# Patient Record
Sex: FEMALE | Race: WHITE | NOT HISPANIC OR LATINO | Employment: FULL TIME | URBAN - METROPOLITAN AREA
[De-identification: names, ages, dates, MRNs, and addresses within clinical notes are randomized per-mention and may not be internally consistent; named-entity substitution may affect disease eponyms.]

---

## 2022-08-15 ENCOUNTER — HOSPITAL ENCOUNTER (EMERGENCY)
Facility: HOSPITAL | Age: 57
Discharge: HOME | End: 2022-08-15
Attending: EMERGENCY MEDICINE | Admitting: EMERGENCY MEDICINE
Payer: COMMERCIAL

## 2022-08-15 ENCOUNTER — APPOINTMENT (OUTPATIENT)
Dept: CT IMAGING | Facility: HOSPITAL | Age: 57
End: 2022-08-15
Attending: EMERGENCY MEDICINE
Payer: COMMERCIAL

## 2022-08-15 VITALS
WEIGHT: 140 LBS | HEIGHT: 64 IN | HEART RATE: 102 BPM | OXYGEN SATURATION: 97 % | BODY MASS INDEX: 23.9 KG/M2 | DIASTOLIC BLOOD PRESSURE: 70 MMHG | RESPIRATION RATE: 20 BRPM | SYSTOLIC BLOOD PRESSURE: 126 MMHG

## 2022-08-15 DIAGNOSIS — R11.2 NON-INTRACTABLE VOMITING WITH NAUSEA, UNSPECIFIED VOMITING TYPE: ICD-10-CM

## 2022-08-15 DIAGNOSIS — E83.42 HYPOMAGNESEMIA: ICD-10-CM

## 2022-08-15 DIAGNOSIS — R07.89 ATYPICAL CHEST PAIN: ICD-10-CM

## 2022-08-15 DIAGNOSIS — E87.6 HYPOKALEMIA: ICD-10-CM

## 2022-08-15 LAB
ALBUMIN SERPL-MCNC: 3.8 G/DL (ref 3.5–5)
ALP SERPL-CCNC: 78 U/L (ref 45–120)
ALT SERPL W P-5'-P-CCNC: 56 U/L (ref 0–45)
ANION GAP SERPL CALCULATED.3IONS-SCNC: 19 MMOL/L (ref 5–18)
AST SERPL W P-5'-P-CCNC: 56 U/L (ref 0–40)
BILIRUB DIRECT SERPL-MCNC: 0.3 MG/DL
BILIRUB SERPL-MCNC: 0.8 MG/DL (ref 0–1)
BUN SERPL-MCNC: 4 MG/DL (ref 8–22)
CALCIUM SERPL-MCNC: 9.3 MG/DL (ref 8.5–10.5)
CHLORIDE BLD-SCNC: 87 MMOL/L (ref 98–107)
CO2 SERPL-SCNC: 25 MMOL/L (ref 22–31)
CREAT SERPL-MCNC: 0.6 MG/DL (ref 0.6–1.1)
D DIMER PPP FEU-MCNC: 1.11 UG/ML FEU (ref 0–0.5)
ERYTHROCYTE [DISTWIDTH] IN BLOOD BY AUTOMATED COUNT: 12.9 % (ref 10–15)
GFR SERPL CREATININE-BSD FRML MDRD: >90 ML/MIN/1.73M2
GLUCOSE BLD-MCNC: 116 MG/DL (ref 70–125)
HCT VFR BLD AUTO: 34.9 % (ref 35–47)
HGB BLD-MCNC: 12.7 G/DL (ref 11.7–15.7)
HOLD SPECIMEN: NORMAL
HOLD SPECIMEN: NORMAL
LIPASE SERPL-CCNC: 52 U/L (ref 0–52)
MAGNESIUM SERPL-MCNC: 1.6 MG/DL (ref 1.8–2.6)
MCH RBC QN AUTO: 30.9 PG (ref 26.5–33)
MCHC RBC AUTO-ENTMCNC: 36.4 G/DL (ref 31.5–36.5)
MCV RBC AUTO: 85 FL (ref 78–100)
PLATELET # BLD AUTO: 284 10E3/UL (ref 150–450)
POTASSIUM BLD-SCNC: 2.9 MMOL/L (ref 3.5–5)
PROT SERPL-MCNC: 6.8 G/DL (ref 6–8)
RBC # BLD AUTO: 4.11 10E6/UL (ref 3.8–5.2)
SODIUM SERPL-SCNC: 131 MMOL/L (ref 136–145)
TROPONIN I SERPL-MCNC: 0.04 NG/ML (ref 0–0.29)
WBC # BLD AUTO: 8.3 10E3/UL (ref 4–11)

## 2022-08-15 PROCEDURE — 258N000003 HC RX IP 258 OP 636: Performed by: EMERGENCY MEDICINE

## 2022-08-15 PROCEDURE — 250N000013 HC RX MED GY IP 250 OP 250 PS 637: Performed by: EMERGENCY MEDICINE

## 2022-08-15 PROCEDURE — 96368 THER/DIAG CONCURRENT INF: CPT

## 2022-08-15 PROCEDURE — 96365 THER/PROPH/DIAG IV INF INIT: CPT | Mod: 59

## 2022-08-15 PROCEDURE — 85027 COMPLETE CBC AUTOMATED: CPT | Performed by: EMERGENCY MEDICINE

## 2022-08-15 PROCEDURE — 93005 ELECTROCARDIOGRAM TRACING: CPT | Performed by: EMERGENCY MEDICINE

## 2022-08-15 PROCEDURE — 99285 EMERGENCY DEPT VISIT HI MDM: CPT | Mod: 25

## 2022-08-15 PROCEDURE — 250N000011 HC RX IP 250 OP 636: Performed by: EMERGENCY MEDICINE

## 2022-08-15 PROCEDURE — 85379 FIBRIN DEGRADATION QUANT: CPT | Performed by: EMERGENCY MEDICINE

## 2022-08-15 PROCEDURE — 96361 HYDRATE IV INFUSION ADD-ON: CPT

## 2022-08-15 PROCEDURE — 36415 COLL VENOUS BLD VENIPUNCTURE: CPT | Performed by: EMERGENCY MEDICINE

## 2022-08-15 PROCEDURE — 36415 COLL VENOUS BLD VENIPUNCTURE: CPT | Performed by: STUDENT IN AN ORGANIZED HEALTH CARE EDUCATION/TRAINING PROGRAM

## 2022-08-15 PROCEDURE — 96375 TX/PRO/DX INJ NEW DRUG ADDON: CPT | Mod: 59

## 2022-08-15 PROCEDURE — 84484 ASSAY OF TROPONIN QUANT: CPT | Performed by: EMERGENCY MEDICINE

## 2022-08-15 PROCEDURE — 83690 ASSAY OF LIPASE: CPT | Performed by: EMERGENCY MEDICINE

## 2022-08-15 PROCEDURE — 93005 ELECTROCARDIOGRAM TRACING: CPT | Performed by: STUDENT IN AN ORGANIZED HEALTH CARE EDUCATION/TRAINING PROGRAM

## 2022-08-15 PROCEDURE — 82310 ASSAY OF CALCIUM: CPT | Performed by: EMERGENCY MEDICINE

## 2022-08-15 PROCEDURE — 71275 CT ANGIOGRAPHY CHEST: CPT

## 2022-08-15 PROCEDURE — 82248 BILIRUBIN DIRECT: CPT | Performed by: EMERGENCY MEDICINE

## 2022-08-15 PROCEDURE — 85027 COMPLETE CBC AUTOMATED: CPT | Performed by: STUDENT IN AN ORGANIZED HEALTH CARE EDUCATION/TRAINING PROGRAM

## 2022-08-15 PROCEDURE — 83735 ASSAY OF MAGNESIUM: CPT | Performed by: EMERGENCY MEDICINE

## 2022-08-15 RX ORDER — POTASSIUM CHLORIDE 1.5 G/1.58G
40 POWDER, FOR SOLUTION ORAL ONCE
Status: COMPLETED | OUTPATIENT
Start: 2022-08-15 | End: 2022-08-15

## 2022-08-15 RX ORDER — POTASSIUM CHLORIDE 7.45 MG/ML
10 INJECTION INTRAVENOUS ONCE
Status: COMPLETED | OUTPATIENT
Start: 2022-08-15 | End: 2022-08-15

## 2022-08-15 RX ORDER — ONDANSETRON 2 MG/ML
4 INJECTION INTRAMUSCULAR; INTRAVENOUS ONCE
Status: COMPLETED | OUTPATIENT
Start: 2022-08-15 | End: 2022-08-15

## 2022-08-15 RX ORDER — IOPAMIDOL 755 MG/ML
75 INJECTION, SOLUTION INTRAVASCULAR ONCE
Status: COMPLETED | OUTPATIENT
Start: 2022-08-15 | End: 2022-08-15

## 2022-08-15 RX ORDER — IBUPROFEN 600 MG/1
600 TABLET, FILM COATED ORAL ONCE
Status: COMPLETED | OUTPATIENT
Start: 2022-08-15 | End: 2022-08-15

## 2022-08-15 RX ORDER — NITROGLYCERIN 0.4 MG/1
0.4 TABLET SUBLINGUAL EVERY 5 MIN PRN
Status: COMPLETED | OUTPATIENT
Start: 2022-08-15 | End: 2022-08-15

## 2022-08-15 RX ORDER — ONDANSETRON 4 MG/1
4 TABLET, ORALLY DISINTEGRATING ORAL EVERY 8 HOURS PRN
Qty: 10 TABLET | Refills: 0 | Status: SHIPPED | OUTPATIENT
Start: 2022-08-15 | End: 2022-08-18

## 2022-08-15 RX ORDER — MAGNESIUM SULFATE HEPTAHYDRATE 40 MG/ML
2 INJECTION, SOLUTION INTRAVENOUS ONCE
Status: COMPLETED | OUTPATIENT
Start: 2022-08-15 | End: 2022-08-15

## 2022-08-15 RX ADMIN — MAGNESIUM SULFATE IN WATER 2 G: 40 INJECTION, SOLUTION INTRAVENOUS at 15:49

## 2022-08-15 RX ADMIN — NITROGLYCERIN 0.4 MG: 0.4 TABLET, ORALLY DISINTEGRATING SUBLINGUAL at 13:41

## 2022-08-15 RX ADMIN — IBUPROFEN 600 MG: 600 TABLET ORAL at 15:49

## 2022-08-15 RX ADMIN — SODIUM CHLORIDE 1000 ML: 9 INJECTION, SOLUTION INTRAVENOUS at 14:14

## 2022-08-15 RX ADMIN — POTASSIUM CHLORIDE 10 MEQ: 7.46 INJECTION, SOLUTION INTRAVENOUS at 15:56

## 2022-08-15 RX ADMIN — NITROGLYCERIN 0.4 MG: 0.4 TABLET, ORALLY DISINTEGRATING SUBLINGUAL at 13:35

## 2022-08-15 RX ADMIN — IOPAMIDOL 75 ML: 755 INJECTION, SOLUTION INTRAVENOUS at 15:39

## 2022-08-15 RX ADMIN — ONDANSETRON 4 MG: 2 INJECTION INTRAMUSCULAR; INTRAVENOUS at 13:54

## 2022-08-15 RX ADMIN — POTASSIUM CHLORIDE 40 MEQ: 1.5 POWDER, FOR SOLUTION ORAL at 15:49

## 2022-08-15 RX ADMIN — NITROGLYCERIN 0.4 MG: 0.4 TABLET, ORALLY DISINTEGRATING SUBLINGUAL at 13:24

## 2022-08-15 ASSESSMENT — ENCOUNTER SYMPTOMS
HEADACHES: 1
VOMITING: 1
DIAPHORESIS: 1
NAUSEA: 1
SHORTNESS OF BREATH: 1

## 2022-08-15 ASSESSMENT — ACTIVITIES OF DAILY LIVING (ADL)
ADLS_ACUITY_SCORE: 35

## 2022-08-15 NOTE — ED TRIAGE NOTES
Pt comes in with chest pain that started in the middle of the night, unsure the exact time. Pt has taken tylenol with no relief. Denies cardiac hx. Pt pain in center/left chest and radiates to L shoulder. Pain worse with walking.

## 2022-08-15 NOTE — DISCHARGE INSTRUCTIONS
Aside from your low magnesium and potassium levels your laboratory tests all appear reassuring.  EKG, cardiac enzymes, and chest CT scan also appeared reassuring.       Use the prescribed Zofran as needed for any further episodes of nausea or vomiting.  Continue to drink plenty of electrolyte containing fluids over the next few days.    Please follow-up with your primary care provider for reevaluation and for a recheck of your potassium and magnesium levels.  Return back to ED sooner for any worsening chest pain, shortness of breath, vomiting, fevers, or any other new or concerning symptoms.

## 2022-08-15 NOTE — ED PROVIDER NOTES
EMERGENCY DEPARTMENT ENCOUNTER      NAME: Roxy Snyder  AGE: 56 year old female  YOB: 1965  MRN: [id number]  EVALUATION DATE & TIME: 8/15/2022 12:38 PM    PCP: No primary care provider on file.    ED PROVIDER: Ronel Phan DO      Chief Complaint   Patient presents with     Chest Pain         FINAL IMPRESSION:  1. Atypical chest pain    2. Hypokalemia    3. Hypomagnesemia    4. Non-intractable vomiting with nausea, unspecified vomiting type          ED COURSE & MEDICAL DECISION MAKIN-year-old female presented to the ED for evaluation of sudden onset left-sided chest pain that woke her up from sleep last night.  The patient states that the chest pain associated with nausea and vomiting and diaphoresis.  The chest pain had been ongoing continuously prior to ED arrival.  Upon arrival to the ED the patient was noted to be slightly hypertensive but she was otherwise hemodynamically stable.  The patient received nitroglycerin prior to my evaluation.  The patient stated that the chest pain resolved with glycerin but she was no experiencing a headache.  She was also still experiencing nausea and vomiting.  Exam the patient was noted to have reproducible epigastric abdominal tenderness to palpation.  The remainder of the physical exam was unremarkable.    EKG revealed normal sinus rhythm without any new or concerning ST or T wave changes.  CBC, hepatic panel, lipase, troponin were all reassuring.  The patient's potassium was low at 2.9.  Sodium was also low at 131 and magnesium was low at 1.6.  Patient was given IV fluids, IV magnesium, and both IV and oral potassium.      The patient's D-dimer was also elevated slightly at 1.1.  Chest CT scan was obtained.  CT scan was negative for a PE.  Other than some mild air trapping the chest CT scan was unremarkable.     The patient was reevaluated after receiving the IV magnesium and potassium.  The patient stated that she was feeling better and she denies  "any chest pain.  The patient was informed that her chest pain is likely related to spasms which may be related to the low magnesium and potassium levels.  This may also be contributing to the \"charley horses\" that the patient experienced recently.  At the time of reevaluation the patient was requesting to return home.  The patient was sent home with Zofran to take as needed for any further episodes of nausea or vomiting.  She was instructed to follow-up with her primary care provider for reevaluation and for recheck of her potassium and magnesium levels within the next few days.  Patient was instructed to return back to ED sooner for any worsening chest pain, shortness of breath, vomiting, dizziness, abdominal pain, or any other new or concerning symptoms.    Pertinent Labs & Imaging studies reviewed. (See chart for details)  2:01 I met with the patient to gather history and to perform my initial exam. We discussed plans for the ED course, including diagnostic testing and treatment. PPE: Provider wore gloves, N95 mask, eye protection.       At the conclusion of the encounter I discussed the results of all of the tests and the disposition. The questions were answered. The patient or family acknowledged understanding and was agreeable with the care plan.       PPE worn: n95 mask, goggles    MEDICATIONS GIVEN IN THE EMERGENCY:  Medications   nitroGLYcerin (NITROSTAT) sublingual tablet 0.4 mg (0.4 mg Sublingual Given 8/15/22 1341)   ondansetron (ZOFRAN) injection 4 mg (4 mg Intravenous Given 8/15/22 1354)   0.9% sodium chloride BOLUS (0 mLs Intravenous Stopped 8/15/22 1713)   magnesium sulfate 2 g in water intermittent infusion (0 g Intravenous Stopped 8/15/22 1704)   potassium chloride 10 mEq in 100 mL sterile water intermittent infusion (premix) (0 mEq Intravenous Stopped 8/15/22 1704)   potassium chloride (KLOR-CON) Packet 40 mEq (40 mEq Oral Given 8/15/22 1549)   ibuprofen (ADVIL/MOTRIN) tablet 600 mg (600 mg Oral " Given 8/15/22 1549)   iopamidol (ISOVUE-370) solution 75 mL (75 mLs Intravenous Given 8/15/22 1539)       NEW PRESCRIPTIONS STARTED AT TODAY'S ER VISIT  New Prescriptions    ONDANSETRON (ZOFRAN ODT) 4 MG ODT TAB    Take 1 tablet (4 mg) by mouth every 8 hours as needed          =================================================================    HPI    Patient information was obtained from: the patient     Use of : N/A    Roxy Snyder is a 56 year old female with no pertinent medical history, who presents to this ED by walk-in for evaluation of chest pain.    Patient reports that while she was sleeping last night, she woke up to a new chest pain and sweating. She describes her pain as starting in the left side of her chest and radiating to her left shoulder. Chest pain has been constant since onset. Also reports of bassem horses in legs last night. She reports shortness of breath while walking and while she was laying in bed. She took Tylenol with no relief and reports that sitting up does not provide relief.    Currently, she reports feeling hot and cold flashes, vomiting, nausea, and a headache from the medications given to her upon arrival. She reports that the pain has subsided since receiving medication in the ED, but still endorses nausea.     No other reported medical conditions at this time.     SHx: Patient reports smoking cigarettes. She does not drink alcohol.     REVIEW OF SYSTEMS   Review of Systems   Constitutional: Positive for diaphoresis (resolved).        Positive for hot and cold flashes   Respiratory: Positive for shortness of breath.    Cardiovascular: Positive for chest pain.   Gastrointestinal: Positive for nausea and vomiting.   Musculoskeletal:        Positive for left shoulder pain (secondary to chest pain)   Neurological: Positive for headaches (secondary to medications).   All other systems reviewed and are negative.       PAST MEDICAL HISTORY:  No past medical history on  "file.    PAST SURGICAL HISTORY:  No past surgical history on file.        CURRENT MEDICATIONS:    ondansetron (ZOFRAN ODT) 4 MG ODT tab        ALLERGIES:  No Known Allergies    FAMILY HISTORY:  No family history on file.    SOCIAL HISTORY:   Social History     Socioeconomic History     Marital status: Single       VITALS:  /70   Pulse 102   Resp 20   Ht 1.626 m (5' 4\")   Wt 63.5 kg (140 lb)   SpO2 97%   BMI 24.03 kg/m      PHYSICAL EXAM    General presentation: Alert, Vital signs reviewed. Fatigued.  HENT: ENT inspection is normal. Oropharynx is moist and clear.   Eye: Pupils are equal and reactive to light. EOMI  Neck: The neck is supple, with full ROM, with no evidence of meningismus.  Pulmonary: Currently in no acute respiratory distress. Normal, non labored respirations, the lung sounds are normal with good equal air movement. Clear to auscultation bilaterally.   Circulatory: Regular rate and rhythm. Peripheral pulses are strong and equal. No murmurs, rubs, or gallops.   Abdominal: The abdomen is soft. Mild epigastric tenderness to palpation. No rigidity, guarding, or rebound. Bowel sounds normal.   Neurologic: Alert, oriented to person, place, and time. No motor deficit. No sensory deficit. Cranial nerves II through XII are intact.  Musculoskeletal: No extremity tenderness. Full range of motion in all extremities. No extremity edema.   Skin: Skin color is normal. No rash. Warm. Dry to touch.      LAB:  All pertinent labs reviewed and interpreted.  Results for orders placed or performed during the hospital encounter of 08/15/22   CT Chest Pulmonary Embolism w Contrast    Impression    IMPRESSION:  1.  No pulmonary embolus.  2.  Question of a mild amount of air trapping involving the bilateral lobes. This can be seen with small airways disease.   CBC (+ platelets, no diff)   Result Value Ref Range    WBC Count 8.3 4.0 - 11.0 10e3/uL    RBC Count 4.11 3.80 - 5.20 10e6/uL    Hemoglobin 12.7 11.7 - 15.7 " g/dL    Hematocrit 34.9 (L) 35.0 - 47.0 %    MCV 85 78 - 100 fL    MCH 30.9 26.5 - 33.0 pg    MCHC 36.4 31.5 - 36.5 g/dL    RDW 12.9 10.0 - 15.0 %    Platelet Count 284 150 - 450 10e3/uL   Basic metabolic panel   Result Value Ref Range    Sodium 131 (L) 136 - 145 mmol/L    Potassium 2.9 (L) 3.5 - 5.0 mmol/L    Chloride 87 (L) 98 - 107 mmol/L    Carbon Dioxide (CO2) 25 22 - 31 mmol/L    Anion Gap 19 (H) 5 - 18 mmol/L    Urea Nitrogen 4 (L) 8 - 22 mg/dL    Creatinine 0.60 0.60 - 1.10 mg/dL    Calcium 9.3 8.5 - 10.5 mg/dL    Glucose 116 70 - 125 mg/dL    GFR Estimate >90 >60 mL/min/1.73m2   Troponin I (now)   Result Value Ref Range    Troponin I 0.04 0.00 - 0.29 ng/mL   Extra Blue Top Tube   Result Value Ref Range    Hold Specimen JIC    Extra Purple Top Tube   Result Value Ref Range    Hold Specimen JIC    Hepatic function panel   Result Value Ref Range    Bilirubin Total 0.8 0.0 - 1.0 mg/dL    Bilirubin Direct 0.3 <=0.5 mg/dL    Protein Total 6.8 6.0 - 8.0 g/dL    Albumin 3.8 3.5 - 5.0 g/dL    Alkaline Phosphatase 78 45 - 120 U/L    AST 56 (H) 0 - 40 U/L    ALT 56 (H) 0 - 45 U/L   Result Value Ref Range    Lipase 52 0 - 52 U/L   D dimer quantitative   Result Value Ref Range    D-Dimer Quantitative 1.11 (H) 0.00 - 0.50 ug/mL FEU   Result Value Ref Range    Magnesium 1.6 (L) 1.8 - 2.6 mg/dL       RADIOLOGY:  Reviewed all pertinent imaging. Please see official radiology report.  CT Chest Pulmonary Embolism w Contrast   Final Result   IMPRESSION:   1.  No pulmonary embolus.   2.  Question of a mild amount of air trapping involving the bilateral lobes. This can be seen with small airways disease.          EKG:    Sinus tachycardia.  Rate of 103.  Normal QRS.  Normal QT.  No ST or T wave changes.  No previous EKG available for comparison.    I have independently reviewed and interpreted the EKG(s) documented above.      ILiu , am serving as a scribe to document services personally performed by Jaremy  DO Emilie based on my observation and the provider's statements to me. I, Ronel Phan, attest that Liu Elizabeth is acting in a scribe capacity, has observed my performance of the services and has documented them in accordance with my direction.    Ronel Phan DO  Emergency Medicine  [facility] EMERGENCY DEPARTMENT  [address] [place] [id number]  [phone]     Ronel Phan DO  08/15/22 5247

## 2022-08-16 ENCOUNTER — HOSPITAL ENCOUNTER (EMERGENCY)
Facility: HOSPITAL | Age: 57
Discharge: HOME | End: 2022-08-16
Attending: EMERGENCY MEDICINE | Admitting: EMERGENCY MEDICINE
Payer: COMMERCIAL

## 2022-08-16 VITALS
TEMPERATURE: 98.4 F | SYSTOLIC BLOOD PRESSURE: 119 MMHG | BODY MASS INDEX: 24.8 KG/M2 | HEART RATE: 90 BPM | WEIGHT: 140 LBS | HEIGHT: 63 IN | DIASTOLIC BLOOD PRESSURE: 67 MMHG | RESPIRATION RATE: 22 BRPM | OXYGEN SATURATION: 98 %

## 2022-08-16 DIAGNOSIS — R07.9 CHEST PAIN, UNSPECIFIED TYPE: ICD-10-CM

## 2022-08-16 LAB
ALBUMIN SERPL-MCNC: 3.4 G/DL (ref 3.5–5)
ALP SERPL-CCNC: 71 U/L (ref 45–120)
ALT SERPL W P-5'-P-CCNC: 52 U/L (ref 0–45)
ANION GAP SERPL CALCULATED.3IONS-SCNC: 9 MMOL/L (ref 5–18)
AST SERPL W P-5'-P-CCNC: 45 U/L (ref 0–40)
ATRIAL RATE - MUSE: 103 BPM
ATRIAL RATE - MUSE: 94 BPM
BASOPHILS # BLD AUTO: 0 10E3/UL (ref 0–0.2)
BASOPHILS NFR BLD AUTO: 0 %
BILIRUB DIRECT SERPL-MCNC: 0.3 MG/DL
BILIRUB SERPL-MCNC: 0.7 MG/DL (ref 0–1)
BUN SERPL-MCNC: 7 MG/DL (ref 8–22)
CALCIUM SERPL-MCNC: 9 MG/DL (ref 8.5–10.5)
CHLORIDE BLD-SCNC: 94 MMOL/L (ref 98–107)
CO2 SERPL-SCNC: 29 MMOL/L (ref 22–31)
CREAT SERPL-MCNC: 0.7 MG/DL (ref 0.6–1.1)
DIASTOLIC BLOOD PRESSURE - MUSE: 65 MMHG
DIASTOLIC BLOOD PRESSURE - MUSE: NORMAL MMHG
EOSINOPHIL # BLD AUTO: 0 10E3/UL (ref 0–0.7)
EOSINOPHIL NFR BLD AUTO: 0 %
ERYTHROCYTE [DISTWIDTH] IN BLOOD BY AUTOMATED COUNT: 13.2 % (ref 10–15)
FLUAV RNA SPEC QL NAA+PROBE: NEGATIVE
FLUBV RNA RESP QL NAA+PROBE: NEGATIVE
GFR SERPL CREATININE-BSD FRML MDRD: >90 ML/MIN/1.73M2
GLUCOSE BLD-MCNC: 150 MG/DL (ref 70–125)
HCT VFR BLD AUTO: 33.2 % (ref 35–47)
HGB BLD-MCNC: 11.7 G/DL (ref 11.7–15.7)
IMM GRANULOCYTES # BLD: 0 10E3/UL
IMM GRANULOCYTES NFR BLD: 1 %
INTERPRETATION ECG - MUSE: NORMAL
INTERPRETATION ECG - MUSE: NORMAL
LIPASE SERPL-CCNC: 83 U/L (ref 0–52)
LYMPHOCYTES # BLD AUTO: 1.3 10E3/UL (ref 0.8–5.3)
LYMPHOCYTES NFR BLD AUTO: 22 %
MAGNESIUM SERPL-MCNC: 1.9 MG/DL (ref 1.8–2.6)
MCH RBC QN AUTO: 30.9 PG (ref 26.5–33)
MCHC RBC AUTO-ENTMCNC: 35.2 G/DL (ref 31.5–36.5)
MCV RBC AUTO: 88 FL (ref 78–100)
MONOCYTES # BLD AUTO: 0.4 10E3/UL (ref 0–1.3)
MONOCYTES NFR BLD AUTO: 7 %
NEUTROPHILS # BLD AUTO: 4.2 10E3/UL (ref 1.6–8.3)
NEUTROPHILS NFR BLD AUTO: 70 %
NRBC # BLD AUTO: 0 10E3/UL
NRBC BLD AUTO-RTO: 0 /100
P AXIS - MUSE: 67 DEGREES
P AXIS - MUSE: NORMAL DEGREES
PLATELET # BLD AUTO: 222 10E3/UL (ref 150–450)
POTASSIUM BLD-SCNC: 2.9 MMOL/L (ref 3.5–5)
PR INTERVAL - MUSE: 112 MS
PR INTERVAL - MUSE: 128 MS
PROT SERPL-MCNC: 6.5 G/DL (ref 6–8)
QRS DURATION - MUSE: 88 MS
QRS DURATION - MUSE: 92 MS
QT - MUSE: 372 MS
QT - MUSE: 408 MS
QTC - MUSE: 487 MS
QTC - MUSE: 510 MS
R AXIS - MUSE: 55 DEGREES
R AXIS - MUSE: 74 DEGREES
RBC # BLD AUTO: 3.79 10E6/UL (ref 3.8–5.2)
RSV RNA SPEC NAA+PROBE: NEGATIVE
SARS-COV-2 RNA RESP QL NAA+PROBE: NEGATIVE
SODIUM SERPL-SCNC: 132 MMOL/L (ref 136–145)
SYSTOLIC BLOOD PRESSURE - MUSE: 127 MMHG
SYSTOLIC BLOOD PRESSURE - MUSE: NORMAL MMHG
T AXIS - MUSE: 82 DEGREES
T AXIS - MUSE: 82 DEGREES
TROPONIN I SERPL-MCNC: 0.03 NG/ML (ref 0–0.29)
VENTRICULAR RATE- MUSE: 103 BPM
VENTRICULAR RATE- MUSE: 94 BPM
WBC # BLD AUTO: 5.9 10E3/UL (ref 4–11)

## 2022-08-16 PROCEDURE — 83690 ASSAY OF LIPASE: CPT | Performed by: EMERGENCY MEDICINE

## 2022-08-16 PROCEDURE — 99284 EMERGENCY DEPT VISIT MOD MDM: CPT | Mod: 25

## 2022-08-16 PROCEDURE — 258N000003 HC RX IP 258 OP 636: Performed by: EMERGENCY MEDICINE

## 2022-08-16 PROCEDURE — 83735 ASSAY OF MAGNESIUM: CPT | Performed by: EMERGENCY MEDICINE

## 2022-08-16 PROCEDURE — C9803 HOPD COVID-19 SPEC COLLECT: HCPCS

## 2022-08-16 PROCEDURE — 96361 HYDRATE IV INFUSION ADD-ON: CPT

## 2022-08-16 PROCEDURE — 82248 BILIRUBIN DIRECT: CPT | Performed by: EMERGENCY MEDICINE

## 2022-08-16 PROCEDURE — 84484 ASSAY OF TROPONIN QUANT: CPT | Performed by: EMERGENCY MEDICINE

## 2022-08-16 PROCEDURE — 36415 COLL VENOUS BLD VENIPUNCTURE: CPT | Performed by: EMERGENCY MEDICINE

## 2022-08-16 PROCEDURE — 250N000011 HC RX IP 250 OP 636: Performed by: EMERGENCY MEDICINE

## 2022-08-16 PROCEDURE — 250N000013 HC RX MED GY IP 250 OP 250 PS 637: Performed by: EMERGENCY MEDICINE

## 2022-08-16 PROCEDURE — 96374 THER/PROPH/DIAG INJ IV PUSH: CPT

## 2022-08-16 PROCEDURE — 87637 SARSCOV2&INF A&B&RSV AMP PRB: CPT | Performed by: EMERGENCY MEDICINE

## 2022-08-16 PROCEDURE — 85025 COMPLETE CBC W/AUTO DIFF WBC: CPT | Performed by: EMERGENCY MEDICINE

## 2022-08-16 PROCEDURE — 93005 ELECTROCARDIOGRAM TRACING: CPT | Performed by: EMERGENCY MEDICINE

## 2022-08-16 RX ORDER — POTASSIUM CHLORIDE 1500 MG/1
20 TABLET, EXTENDED RELEASE ORAL 2 TIMES DAILY
Qty: 10 TABLET | Refills: 0 | Status: SHIPPED | OUTPATIENT
Start: 2022-08-16 | End: 2022-08-21

## 2022-08-16 RX ORDER — KETOROLAC TROMETHAMINE 15 MG/ML
15 INJECTION, SOLUTION INTRAMUSCULAR; INTRAVENOUS ONCE
Status: COMPLETED | OUTPATIENT
Start: 2022-08-16 | End: 2022-08-16

## 2022-08-16 RX ORDER — POTASSIUM CHLORIDE 1500 MG/1
40 TABLET, EXTENDED RELEASE ORAL ONCE
Status: COMPLETED | OUTPATIENT
Start: 2022-08-16 | End: 2022-08-16

## 2022-08-16 RX ORDER — LIDOCAINE 4 G/G
1 PATCH TOPICAL
Status: DISCONTINUED | OUTPATIENT
Start: 2022-08-16 | End: 2022-08-16

## 2022-08-16 RX ORDER — LIDOCAINE 4 G/G
1 PATCH TOPICAL ONCE
Status: DISCONTINUED | OUTPATIENT
Start: 2022-08-16 | End: 2022-08-16 | Stop reason: HOSPADM

## 2022-08-16 RX ORDER — OXYCODONE AND ACETAMINOPHEN 5; 325 MG/1; MG/1
1 TABLET ORAL ONCE
Status: COMPLETED | OUTPATIENT
Start: 2022-08-16 | End: 2022-08-16

## 2022-08-16 RX ADMIN — POTASSIUM CHLORIDE 40 MEQ: 1500 TABLET, EXTENDED RELEASE ORAL at 12:04

## 2022-08-16 RX ADMIN — OXYCODONE HYDROCHLORIDE AND ACETAMINOPHEN 1 TABLET: 5; 325 TABLET ORAL at 12:09

## 2022-08-16 RX ADMIN — KETOROLAC TROMETHAMINE 15 MG: 15 INJECTION, SOLUTION INTRAMUSCULAR; INTRAVENOUS at 10:57

## 2022-08-16 RX ADMIN — LIDOCAINE 1 PATCH: 246 PATCH TOPICAL at 10:47

## 2022-08-16 RX ADMIN — SODIUM CHLORIDE 1000 ML: 9 INJECTION, SOLUTION INTRAVENOUS at 10:54

## 2022-08-16 ASSESSMENT — ACTIVITIES OF DAILY LIVING (ADL): ADLS_ACUITY_SCORE: 35

## 2022-08-16 NOTE — ED TRIAGE NOTES
"     Triage Assessment     Row Name 08/16/22 1014       Triage Assessment (Adult)    Airway WDL X    Additional Documentation --  increased pain with breathing and any movements       Respiratory WDL    Respiratory WDL --  difficulties taking a deep breath            Patient was in this ED yesterday with same c/o- holds right ribs and states \"I have chest pan.\"  Pt reports increased pain with movements/deep breath, took Tylenol early this morning.  "

## 2022-08-16 NOTE — DISCHARGE INSTRUCTIONS
You are seen in the emergency department for continued right-sided chest pain.  Your evaluation today included some repeat lab studies which looked generally stable.  Your potassium level remains somewhat low and we would like you to continue replacing this at home for the next few days.  We think your pain could be related to chest wall pain or pleurisy.  These kinds of things usually respond well to a scheduled anti-inflammatory medications like naproxen 500 mg twice a day.  He can also use Tylenol 500 mg every 6 hours and continue lidocaine patches for 12 hours at a time like the 1 that was applied here.  Please plan to follow-up in clinic within the next 1 to 2 weeks to review any ongoing symptoms

## 2022-08-16 NOTE — ED NOTES
Discharge paperwork, prescriptions and follow up care discussed with pt. Pt verbalized understanding and has no questions at this time. VSS. Pt is a/ox4 abc's intact and ambulatory with steady gate when leaving ED.

## 2022-08-16 NOTE — ED PROVIDER NOTES
EMERGENCY DEPARTMENT ENCOUNTER      NAME: Roxy Snyder  AGE: 56 year old female  YOB: 1965  MRN: [id number]  EVALUATION DATE & TIME: 2022 10:19 AM    PCP: System, Provider Not In    ED PROVIDER: Chavo Enrique M.D.      Chief Complaint   Patient presents with     Shortness of Breath     Rib Pain       FINAL IMPRESSION:  1. Chest pain, unspecified type        ED COURSE & MEDICAL DECISION MAKIN year old female presents to the Emergency Department for evaluation of shortness of breath and right-sided chest pain.  She is vitally stable and she arrives to the emergency department.  Cardiopulmonary exam is unremarkable.  Seen yesterday for vomiting as well as chest discomfort.  At that visit she had a CT chest with pulmonary angiography which was negative except for possible air trapping which would go along with her longstanding smoking history.  Her oximetry is appropriate today and lungs continue to sound clear.  She is primarily concerned at this point about pain in her right sided chest wall.  She also reports feeling short of breath.  Lab work-up was undertaken/repeated and continues to appear stable.  She still has some moderate hypokalemia which was again replaced and she will be given a prescription for potassium.  Her abdominal exam remains benign.  She has some mild derangements of her LFTs which would go along with her reported alcohol use.  She does not appear to be in withdrawal.  With her reassuring pulmonary exam and imaging completed yesterday and will think there is any further indication for additional radiography at this time.  Reassessed the patient who had fair pain relief from Toradol and a lidocaine patch.  We discussed continued supportive measures and NSAIDs for home.  Primary care follow-up was advised.  Patient was discharged in good condition.    10:22 AM I met with patient for initial interview and encounter. PPE worn includes N59 mask and nitrile gloves.    12:14 PM I rechecked with patient to discuss plan for discharge.     At the conclusion of the encounter I discussed the results of all of the tests and the disposition. The questions were answered. The patient or family acknowledged understanding and was agreeable with the care plan.       MEDICATIONS GIVEN IN THE EMERGENCY:  Medications   0.9% sodium chloride BOLUS (0 mLs Intravenous Stopped 8/16/22 1213)   ketorolac (TORADOL) injection 15 mg (15 mg Intravenous Given 8/16/22 1057)   potassium chloride ER (KLOR-CON M) CR tablet 40 mEq (40 mEq Oral Given 8/16/22 1204)   oxyCODONE-acetaminophen (PERCOCET) 5-325 MG per tablet 1 tablet (1 tablet Oral Given 8/16/22 1209)       NEW PRESCRIPTIONS STARTED AT TODAY'S ER VISIT  Discharge Medication List as of 8/16/2022 12:16 PM      START taking these medications    Details   potassium chloride ER (K-TAB) 20 MEQ CR tablet Take 1 tablet (20 mEq) by mouth 2 times daily for 5 days, Disp-10 tablet, R-0, Local Print                =================================================================    HPI    Patient information was obtained from: Patient    Use of : N/A         Roxy Snyder is a 56 year old female with no known pertinent medical history who presents to this ED by walk-in for evaluation of chest pain.    Per chart review, patient was seen at this ED yesterday for similar symptoms. Imaging and labs significant for EKG negative for ST or T wave changes, CT scan negative for PE but had mild air trapping, elevated D-dimer, and low potassium and sodium. Given IV magnesium and oral potassium. She was given Zofran as needed.     Patient reports ongoing chest pain that radiates to the right side of her upper back. She also complains of shortness of breath, dizziness, and vomiting (resolved). Patient reports she took a Tylenol Extra Strength without significant relief. She is a smoker and her last alcoholic drink was about a week ago.    Patient otherwise  "denies cough, leg pain or swelling, or rash.       REVIEW OF SYSTEMS   All systems reviewed and negative except as noted in HPI.    PAST MEDICAL HISTORY:  History reviewed. No pertinent past medical history.    PAST SURGICAL HISTORY:  No past surgical history on file.      CURRENT MEDICATIONS:    No current facility-administered medications for this encounter.     Current Outpatient Medications   Medication     potassium chloride ER (K-TAB) 20 MEQ CR tablet     ondansetron (ZOFRAN ODT) 4 MG ODT tab         ALLERGIES:  No Known Allergies    FAMILY HISTORY:  No family history on file.    SOCIAL HISTORY:   Social History     Socioeconomic History     Marital status: Single       VITALS:  /67   Pulse 90   Temp 98.4  F (36.9  C) (Temporal)   Resp 22   Ht 1.6 m (5' 3\")   Wt 63.5 kg (140 lb)   SpO2 98%   BMI 24.80 kg/m      PHYSICAL EXAM    Constitutional: Well developed, Well nourished, NAD.  HENT: Normocephalic, Atraumatic. Neck Supple.  Eyes: EOMI, Conjunctiva normal.  Respiratory: Breathing comfortably on room air. Speaks full sentences easily. Lungs clear to ascultation.  Cardiovascular: Normal heart rate, Regular rhythm. No peripheral edema.  Abdomen: Soft, nontender  Musculoskeletal: Good range of motion in all major joints. No major deformities noted.  Integument: Warm, Dry.  Neurologic: Alert & awake, Normal motor function, Normal sensory function, No focal deficits noted.   Psychiatric: Cooperative. Affect appropriate.     LAB:  All pertinent labs reviewed and interpreted.  Labs Ordered and Resulted from Time of ED Arrival to Time of ED Departure   BASIC METABOLIC PANEL - Abnormal       Result Value    Sodium 132 (*)     Potassium 2.9 (*)     Chloride 94 (*)     Carbon Dioxide (CO2) 29      Anion Gap 9      Urea Nitrogen 7 (*)     Creatinine 0.70      Calcium 9.0      Glucose 150 (*)     GFR Estimate >90     HEPATIC FUNCTION PANEL - Abnormal    Bilirubin Total 0.7      Bilirubin Direct 0.3      " Protein Total 6.5      Albumin 3.4 (*)     Alkaline Phosphatase 71      AST 45 (*)     ALT 52 (*)    LIPASE - Abnormal    Lipase 83 (*)    CBC WITH PLATELETS AND DIFFERENTIAL - Abnormal    WBC Count 5.9      RBC Count 3.79 (*)     Hemoglobin 11.7      Hematocrit 33.2 (*)     MCV 88      MCH 30.9      MCHC 35.2      RDW 13.2      Platelet Count 222      % Neutrophils 70      % Lymphocytes 22      % Monocytes 7      % Eosinophils 0      % Basophils 0      % Immature Granulocytes 1      NRBCs per 100 WBC 0      Absolute Neutrophils 4.2      Absolute Lymphocytes 1.3      Absolute Monocytes 0.4      Absolute Eosinophils 0.0      Absolute Basophils 0.0      Absolute Immature Granulocytes 0.0      Absolute NRBCs 0.0     TROPONIN I - Normal    Troponin I 0.03     MAGNESIUM - Normal    Magnesium 1.9     INFLUENZA A/B & SARS-COV2 PCR MULTIPLEX - Normal    Influenza A PCR Negative      Influenza B PCR Negative      RSV PCR Negative      SARS CoV2 PCR Negative         RADIOLOGY:  Reviewed all pertinent imaging. Please see official radiology report.  No orders to display       EKG:    Performed at: 8/16/22; 10:32    Impression: Normal sinus rhythm, Prolonged QT    Rate: 94 BPM  Rhythm: Normal sinus rhythm, Prolonged QT  Axis: 74  NC Interval: 112 ms  QRS Interval: 92 ms  QTc Interval: 510 ms  ST Changes: None  Comparison: Premature atrial complexes are no longer present when compared to 8/15/22 ECG.    I have independently reviewed and interpreted the EKG(s) documented above.    I, Barrington Ansari, am serving as a scribe to document services personally performed by Dr. Chavo Enrique, based on my observation and the provider's statements to me. I, Chavo Enrique MD attest that Barrington Ansari is acting in a scribe capacity, has observed my performance of the services and has documented them in accordance with my direction.    Chavo Enrique M.D.  Emergency Medicine  [facility] EMERGENCY DEPARTMENT  [id number]  [facility] [id number]  [phone]  Dept: [phone]     Chavo Enrique MD  08/16/22 0012